# Patient Record
Sex: MALE | Race: BLACK OR AFRICAN AMERICAN | NOT HISPANIC OR LATINO | URBAN - METROPOLITAN AREA
[De-identification: names, ages, dates, MRNs, and addresses within clinical notes are randomized per-mention and may not be internally consistent; named-entity substitution may affect disease eponyms.]

---

## 2018-04-01 ENCOUNTER — EMERGENCY (EMERGENCY)
Facility: HOSPITAL | Age: 28
LOS: 1 days | Discharge: HOME | End: 2018-04-01
Admitting: EMERGENCY MEDICINE
Payer: COMMERCIAL

## 2018-04-01 VITALS
HEART RATE: 82 BPM | DIASTOLIC BLOOD PRESSURE: 81 MMHG | TEMPERATURE: 99 F | RESPIRATION RATE: 16 BRPM | OXYGEN SATURATION: 100 % | SYSTOLIC BLOOD PRESSURE: 120 MMHG

## 2018-04-01 DIAGNOSIS — S31.21XA LACERATION WITHOUT FOREIGN BODY OF PENIS, INITIAL ENCOUNTER: ICD-10-CM
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
## 2018-04-01 PROCEDURE — 99282 EMERGENCY DEPT VISIT SF MDM: CPT

## 2018-04-01 NOTE — ED PROVIDER NOTE - OBJECTIVE STATEMENT
28y M with no pertinent PMHx, presents to ED for laceration. Pt states he noticed a bleeding cut to his penis while in shower today. He was getting ready to have sex noticed more bleeding. No injury, pain, or penile discharge.

## 2018-04-01 NOTE — ED PROVIDER NOTE - NS_EDPROVIDERDISPOUSERTYPE_ED_A_ED
I have personally evaluated and examined the patient. The Attending was available to me as a supervising provider if needed. Scribe Attestation (For Scribes USE Only)... I have personally evaluated and examined the patient. The Attending was available to me as a supervising provider if needed./Scribe Attestation (For Scribes USE Only)...

## 2018-04-01 NOTE — ED PROVIDER NOTE - MEDICAL DECISION MAKING DETAILS
superficial .2 cm laceration of distal penis, no active bleeding.  local wound care only.  discussed rest of area

## 2021-06-14 NOTE — ED PROVIDER NOTE - PSYCHIATRIC, MLM
3 Alert and oriented to person, place, time/situation. normal mood and affect. no apparent risk to self or others.

## 2023-12-06 NOTE — ED PROVIDER NOTE - ENMT, MLM
Airway patent
This was a shared visit with the [facility]. I reviewed and verified the documentation and independently performed the documented:

## 2024-07-02 ENCOUNTER — APPOINTMENT (OUTPATIENT)
Dept: BURN CARE | Facility: CLINIC | Age: 34
End: 2024-07-02